# Patient Record
Sex: FEMALE | Race: WHITE | Employment: PART TIME | URBAN - METROPOLITAN AREA
[De-identification: names, ages, dates, MRNs, and addresses within clinical notes are randomized per-mention and may not be internally consistent; named-entity substitution may affect disease eponyms.]

---

## 2019-03-13 DIAGNOSIS — N39.41 URGE INCONTINENCE: ICD-10-CM

## 2019-03-13 DIAGNOSIS — N81.4 UTERINE PROLAPSE: ICD-10-CM

## 2019-03-13 DIAGNOSIS — N81.6 RECTOCELE: ICD-10-CM

## 2019-03-13 DIAGNOSIS — N81.11 MIDLINE CYSTOCELE: ICD-10-CM

## 2019-03-13 PROBLEM — I10 HYPERTENSION: Status: ACTIVE | Noted: 2019-03-13

## 2019-03-13 RX ORDER — CARVEDILOL 6.25 MG/1
TABLET ORAL
COMMUNITY
Start: 2019-03-10

## 2019-03-13 SDOH — HEALTH STABILITY: MENTAL HEALTH: HOW OFTEN DO YOU HAVE A DRINK CONTAINING ALCOHOL?: NEVER

## 2019-03-18 ENCOUNTER — OFFICE VISIT (OUTPATIENT)
Dept: UROLOGY | Age: 79
End: 2019-03-18
Payer: MEDICARE

## 2019-03-18 ENCOUNTER — HOSPITAL ENCOUNTER (OUTPATIENT)
Age: 79
Setting detail: SPECIMEN
Discharge: HOME | End: 2019-03-18
Payer: MEDICARE

## 2019-03-18 VITALS
BODY MASS INDEX: 43.05 KG/M2 | TEMPERATURE: 97.9 F | DIASTOLIC BLOOD PRESSURE: 78 MMHG | SYSTOLIC BLOOD PRESSURE: 140 MMHG | WEIGHT: 228 LBS | HEIGHT: 61 IN

## 2019-03-18 DIAGNOSIS — N39.46 MIXED INCONTINENCE URGE AND STRESS: ICD-10-CM

## 2019-03-18 DIAGNOSIS — R35.0 FREQUENCY OF URINATION: ICD-10-CM

## 2019-03-18 DIAGNOSIS — R35.0 FREQUENCY OF URINATION: Primary | ICD-10-CM

## 2019-03-18 DIAGNOSIS — R35.1 NOCTURIA: ICD-10-CM

## 2019-03-18 LAB
-: ABNORMAL
AMORPHOUS: ABNORMAL
BACTERIA: ABNORMAL
BILIRUBIN URINE: NEGATIVE
CASTS UA: ABNORMAL /LPF
COLOR: YELLOW
COMMENT UA: ABNORMAL
CRYSTALS, UA: ABNORMAL /HPF
EPITHELIAL CELLS UA: ABNORMAL /HPF (ref 0–25)
GLUCOSE URINE: NEGATIVE
KETONES, URINE: NEGATIVE
LEUKOCYTE ESTERASE, URINE: ABNORMAL
MUCUS: ABNORMAL
NITRITE, URINE: POSITIVE
OTHER OBSERVATIONS UA: ABNORMAL
PH UA: 5.5 (ref 5–9)
PROTEIN UA: NEGATIVE
RBC UA: ABNORMAL /HPF (ref 0–2)
RENAL EPITHELIAL, UA: ABNORMAL /HPF
SPECIFIC GRAVITY UA: 1.02 (ref 1.01–1.02)
TRICHOMONAS: ABNORMAL
TURBIDITY: CLEAR
URINE HGB: ABNORMAL
UROBILINOGEN, URINE: NORMAL
WBC UA: ABNORMAL /HPF (ref 0–5)
YEAST: ABNORMAL

## 2019-03-18 PROCEDURE — G8427 DOCREV CUR MEDS BY ELIG CLIN: HCPCS | Performed by: NURSE PRACTITIONER

## 2019-03-18 PROCEDURE — 87086 URINE CULTURE/COLONY COUNT: CPT

## 2019-03-18 PROCEDURE — G8417 CALC BMI ABV UP PARAM F/U: HCPCS | Performed by: NURSE PRACTITIONER

## 2019-03-18 PROCEDURE — 4040F PNEUMOC VAC/ADMIN/RCVD: CPT | Performed by: NURSE PRACTITIONER

## 2019-03-18 PROCEDURE — 1036F TOBACCO NON-USER: CPT | Performed by: NURSE PRACTITIONER

## 2019-03-18 PROCEDURE — 81001 URINALYSIS AUTO W/SCOPE: CPT

## 2019-03-18 PROCEDURE — 87077 CULTURE AEROBIC IDENTIFY: CPT

## 2019-03-18 PROCEDURE — 1090F PRES/ABSN URINE INCON ASSESS: CPT | Performed by: NURSE PRACTITIONER

## 2019-03-18 PROCEDURE — 99204 OFFICE O/P NEW MOD 45 MIN: CPT | Performed by: NURSE PRACTITIONER

## 2019-03-18 PROCEDURE — 1101F PT FALLS ASSESS-DOCD LE1/YR: CPT | Performed by: NURSE PRACTITIONER

## 2019-03-18 PROCEDURE — G8484 FLU IMMUNIZE NO ADMIN: HCPCS | Performed by: NURSE PRACTITIONER

## 2019-03-18 PROCEDURE — 51798 US URINE CAPACITY MEASURE: CPT | Performed by: NURSE PRACTITIONER

## 2019-03-18 PROCEDURE — 0509F URINE INCON PLAN DOCD: CPT | Performed by: NURSE PRACTITIONER

## 2019-03-18 PROCEDURE — 87186 SC STD MICRODIL/AGAR DIL: CPT

## 2019-03-18 PROCEDURE — G8400 PT W/DXA NO RESULTS DOC: HCPCS | Performed by: NURSE PRACTITIONER

## 2019-03-18 PROCEDURE — 1123F ACP DISCUSS/DSCN MKR DOCD: CPT | Performed by: NURSE PRACTITIONER

## 2019-03-18 RX ORDER — ERGOCALCIFEROL (VITAMIN D2) 1250 MCG
50000 CAPSULE ORAL
COMMUNITY
Start: 2012-05-22

## 2019-03-18 RX ORDER — TROSPIUM CHLORIDE 20 MG/1
20 TABLET, FILM COATED ORAL 2 TIMES DAILY
Qty: 60 TABLET | Refills: 3 | Status: SHIPPED | OUTPATIENT
Start: 2019-03-18 | End: 2019-04-30 | Stop reason: SDUPTHER

## 2019-03-18 RX ORDER — NAPROXEN SODIUM 220 MG
220 TABLET ORAL DAILY
COMMUNITY

## 2019-03-18 ASSESSMENT — ENCOUNTER SYMPTOMS
EYE PAIN: 0
SHORTNESS OF BREATH: 0
COLOR CHANGE: 0
CONSTIPATION: 0
COUGH: 0
EYE REDNESS: 0
WHEEZING: 0
VOMITING: 0
BACK PAIN: 0
ABDOMINAL PAIN: 0
NAUSEA: 0

## 2019-03-20 ENCOUNTER — TELEPHONE (OUTPATIENT)
Dept: UROLOGY | Age: 79
End: 2019-03-20

## 2019-03-20 LAB
CULTURE: ABNORMAL
Lab: ABNORMAL
SPECIMEN DESCRIPTION: ABNORMAL

## 2019-03-20 RX ORDER — CIPROFLOXACIN 500 MG/1
500 TABLET, FILM COATED ORAL 2 TIMES DAILY
Qty: 14 TABLET | Refills: 0 | Status: SHIPPED | OUTPATIENT
Start: 2019-03-20 | End: 2019-03-27

## 2019-04-30 ENCOUNTER — OFFICE VISIT (OUTPATIENT)
Dept: UROLOGY | Age: 79
End: 2019-04-30
Payer: MEDICARE

## 2019-04-30 ENCOUNTER — HOSPITAL ENCOUNTER (OUTPATIENT)
Age: 79
Setting detail: SPECIMEN
Discharge: HOME | End: 2019-04-30
Payer: MEDICARE

## 2019-04-30 VITALS — BODY MASS INDEX: 42.51 KG/M2 | WEIGHT: 225 LBS | SYSTOLIC BLOOD PRESSURE: 146 MMHG | DIASTOLIC BLOOD PRESSURE: 94 MMHG

## 2019-04-30 DIAGNOSIS — R35.0 FREQUENCY OF URINATION: ICD-10-CM

## 2019-04-30 DIAGNOSIS — N39.46 MIXED INCONTINENCE URGE AND STRESS: ICD-10-CM

## 2019-04-30 DIAGNOSIS — R35.1 NOCTURIA: ICD-10-CM

## 2019-04-30 DIAGNOSIS — R35.0 FREQUENCY OF URINATION: Primary | ICD-10-CM

## 2019-04-30 PROCEDURE — 1090F PRES/ABSN URINE INCON ASSESS: CPT | Performed by: NURSE PRACTITIONER

## 2019-04-30 PROCEDURE — 4040F PNEUMOC VAC/ADMIN/RCVD: CPT | Performed by: NURSE PRACTITIONER

## 2019-04-30 PROCEDURE — G8427 DOCREV CUR MEDS BY ELIG CLIN: HCPCS | Performed by: NURSE PRACTITIONER

## 2019-04-30 PROCEDURE — G8400 PT W/DXA NO RESULTS DOC: HCPCS | Performed by: NURSE PRACTITIONER

## 2019-04-30 PROCEDURE — 51798 US URINE CAPACITY MEASURE: CPT | Performed by: NURSE PRACTITIONER

## 2019-04-30 PROCEDURE — 87086 URINE CULTURE/COLONY COUNT: CPT

## 2019-04-30 PROCEDURE — 87186 SC STD MICRODIL/AGAR DIL: CPT

## 2019-04-30 PROCEDURE — 0509F URINE INCON PLAN DOCD: CPT | Performed by: NURSE PRACTITIONER

## 2019-04-30 PROCEDURE — 87077 CULTURE AEROBIC IDENTIFY: CPT

## 2019-04-30 PROCEDURE — 99214 OFFICE O/P EST MOD 30 MIN: CPT | Performed by: NURSE PRACTITIONER

## 2019-04-30 PROCEDURE — G8417 CALC BMI ABV UP PARAM F/U: HCPCS | Performed by: NURSE PRACTITIONER

## 2019-04-30 PROCEDURE — 1123F ACP DISCUSS/DSCN MKR DOCD: CPT | Performed by: NURSE PRACTITIONER

## 2019-04-30 PROCEDURE — 81001 URINALYSIS AUTO W/SCOPE: CPT

## 2019-04-30 PROCEDURE — 1036F TOBACCO NON-USER: CPT | Performed by: NURSE PRACTITIONER

## 2019-04-30 RX ORDER — TROSPIUM CHLORIDE 20 MG/1
20 TABLET, FILM COATED ORAL 2 TIMES DAILY
Qty: 180 TABLET | Refills: 3 | Status: SHIPPED | OUTPATIENT
Start: 2019-04-30 | End: 2019-09-03 | Stop reason: SINTOL

## 2019-04-30 ASSESSMENT — ENCOUNTER SYMPTOMS
VOMITING: 0
SHORTNESS OF BREATH: 0
NAUSEA: 0
COUGH: 0
COLOR CHANGE: 0
BACK PAIN: 0
EYE PAIN: 0
CONSTIPATION: 1
EYE REDNESS: 0
ABDOMINAL PAIN: 0
WHEEZING: 0

## 2019-04-30 NOTE — PATIENT INSTRUCTIONS
It is very important to have regular, daily, bowel movements because this will improve urinary symptoms. Take Miralax (or generic equivalent) 17 g once daily (one capful). Do not skip days. If 1 dose daily causes loose stools/diarrhea, decrease to 1/2 dose or 1/4 dose but be sure to continue taking it daily (it is not the type of medication that you can just use \"as needed,\" must be taken daily to be effective).        Be sure to increase fluids, aim for 80 ounces per day

## 2019-04-30 NOTE — PROGRESS NOTES
HPI:    Patient is a 70 y.o. female in no acute distress. She is alert and oriented to person, place, and time. History  3/2019 Referral from Dr. Peña White for incontinence. She has been experiencing LUTS for the last 15 years. She complains of:    Daytime frequency: Every 1-2 hours. Urgency: yes  Nocturia: Gets up every 2 hours at night. WILFREDO: yes, with position changes, coughing, sneezing, etc... UUI: yes  Some unaware incontinence. Pads per day: 3-4 heavy, large pads. Pads are saturated when changed. She does have 7 children, 6 of which were vaginal births. She is status post lap Velez, A&P repair on 2019. She has tried oxybutynin past, but developed intolerable dry mouth. She does have daily bowel movements. She denies any episodes of hematuria. She denies history of stones. She denies history of frequent urinary tract infections. She does consume 16 ounces of coffee or tea daily. She did see urology in [facility] in the past.     Today  Here today for a 6 week follow-up for frequency, urgency, and mixed incontinence. At her last visit we did start her on trospium twice per day. She is now urinating every 2-3 hours versus every 1-2. She is only getting up 1-2 times per night versus every 2 hours. He is now only using 2 small pads per day versus 3-4 heavy, large pads. She does continue to have stress incontinence with position changes, lifting, coughing, and sneezing. She does have dry mouth with the trospium, but this is tolerable. She has had some trouble with her bowels, they are not always moving daily since starting the trospium. PVR 86ml.       Past Medical History:   Diagnosis Date    Anemia     High blood pressure     High cholesterol     UTI (urinary tract infection)     Wears glasses      Past Surgical History:   Procedure Laterality Date    BACK SURGERY      lower     SECTION      CHOLECYSTECTOMY      COLPOPEXY  2019    DENTAL SURGERY      KNEE SURGERY      (including worsening symptoms, infection, pain) and benefits of pelvic floor rehab to help with WILFREDO. We typically schedule PFR once per week for 6 weeks initially, then re-evaluate at that time. Sessions typically last one hour each week. We will check an explanation of benefits with patient's insurance company prior to starting therapy. Patient is agreeable with this plan. We will check a UA C&S prior to starting pelvic floor rehab.

## 2019-05-01 LAB
CULTURE: ABNORMAL
Lab: ABNORMAL
SPECIMEN DESCRIPTION: ABNORMAL

## 2019-05-02 ENCOUNTER — TELEPHONE (OUTPATIENT)
Dept: UROLOGY | Age: 79
End: 2019-05-02

## 2019-05-02 DIAGNOSIS — N39.0 RECURRENT UTI: Primary | ICD-10-CM

## 2019-05-02 RX ORDER — GRANULES FOR ORAL 3 G/1
3 POWDER ORAL
Qty: 3 EACH | Refills: 0 | Status: SHIPPED | OUTPATIENT
Start: 2019-05-02 | End: 2019-05-22 | Stop reason: ALTCHOICE

## 2019-05-02 NOTE — TELEPHONE ENCOUNTER
[facility] called and left a message for patient to return a call to the office.

## 2019-05-02 NOTE — TELEPHONE ENCOUNTER
UACS is positive for infection. I sent in fosfomycin to treat infection. This is a drink you take once and repeat the dose every 3 days. This is the same type of bacteria you had on your last UACS. I would like to complete a CT scan to check for stones due to the type of bacteria.

## 2019-05-10 ENCOUNTER — HOSPITAL ENCOUNTER (OUTPATIENT)
Dept: CT IMAGING | Age: 79
Discharge: HOME | End: 2019-05-12
Payer: MEDICARE

## 2019-05-10 DIAGNOSIS — N39.0 RECURRENT UTI: ICD-10-CM

## 2019-05-10 PROCEDURE — 74176 CT ABD & PELVIS W/O CONTRAST: CPT

## 2019-05-13 ENCOUNTER — TELEPHONE (OUTPATIENT)
Dept: UROLOGY | Age: 79
End: 2019-05-13

## 2019-05-13 DIAGNOSIS — N39.0 RECURRENT UTI: Primary | ICD-10-CM

## 2019-05-13 NOTE — TELEPHONE ENCOUNTER
According to last telephone encounter she needed a UACS after finishing her antibiotic. Have her do her urine in [place]. We will move out her PFR until next week. We will go over CT results at her PFR apt.  [she lives in [address] [place] [address]

## 2019-05-13 NOTE — TELEPHONE ENCOUNTER
Called patient and informed her that repeat UACS needed prior to starting PFR.  Orders faxed to [facility] per patient's request.

## 2019-05-13 NOTE — TELEPHONE ENCOUNTER
Called patient and left message reminding her of her appointment for PFR #1 tomorrow. Is she to have repeat UACS prior to PFR? Or should she come in to review CT results.

## 2019-05-16 ENCOUNTER — TELEPHONE (OUTPATIENT)
Dept: UROLOGY | Age: 79
End: 2019-05-16

## 2019-05-16 DIAGNOSIS — N39.0 RECURRENT UTI: ICD-10-CM

## 2019-05-17 NOTE — TELEPHONE ENCOUNTER
She is good to start PFR. urine cx reviewed and shows contamination, but the UA is negative for signs of a UTI & for significant microhematuria. CT results we will go over at her visit.

## 2019-05-22 ENCOUNTER — PROCEDURE VISIT (OUTPATIENT)
Dept: UROLOGY | Age: 79
End: 2019-05-22
Payer: MEDICARE

## 2019-05-22 VITALS — SYSTOLIC BLOOD PRESSURE: 136 MMHG | DIASTOLIC BLOOD PRESSURE: 88 MMHG | WEIGHT: 223 LBS | BODY MASS INDEX: 42.14 KG/M2

## 2019-05-22 DIAGNOSIS — M62.81 MUSCLE WEAKNESS: ICD-10-CM

## 2019-05-22 DIAGNOSIS — N39.46 MIXED INCONTINENCE URGE AND STRESS: Primary | ICD-10-CM

## 2019-05-22 DIAGNOSIS — N39.0 RECURRENT UTI: ICD-10-CM

## 2019-05-22 PROCEDURE — G8427 DOCREV CUR MEDS BY ELIG CLIN: HCPCS | Performed by: NURSE PRACTITIONER

## 2019-05-22 PROCEDURE — G8417 CALC BMI ABV UP PARAM F/U: HCPCS | Performed by: NURSE PRACTITIONER

## 2019-05-22 PROCEDURE — 0509F URINE INCON PLAN DOCD: CPT | Performed by: NURSE PRACTITIONER

## 2019-05-22 PROCEDURE — 1036F TOBACCO NON-USER: CPT | Performed by: NURSE PRACTITIONER

## 2019-05-22 PROCEDURE — 51784 ANAL/URINARY MUSCLE STUDY: CPT | Performed by: NURSE PRACTITIONER

## 2019-05-22 PROCEDURE — G8400 PT W/DXA NO RESULTS DOC: HCPCS | Performed by: NURSE PRACTITIONER

## 2019-05-22 PROCEDURE — 91122 PR ANAL PRESSURE RECORD: CPT | Performed by: NURSE PRACTITIONER

## 2019-05-22 PROCEDURE — 97750 PHYSICAL PERFORMANCE TEST: CPT | Performed by: NURSE PRACTITIONER

## 2019-05-22 PROCEDURE — 1090F PRES/ABSN URINE INCON ASSESS: CPT | Performed by: NURSE PRACTITIONER

## 2019-05-22 PROCEDURE — 99213 OFFICE O/P EST LOW 20 MIN: CPT | Performed by: NURSE PRACTITIONER

## 2019-05-22 PROCEDURE — 97032 APPL MODALITY 1+ESTIM EA 15: CPT | Performed by: NURSE PRACTITIONER

## 2019-05-22 PROCEDURE — 4040F PNEUMOC VAC/ADMIN/RCVD: CPT | Performed by: NURSE PRACTITIONER

## 2019-05-22 PROCEDURE — 1123F ACP DISCUSS/DSCN MKR DOCD: CPT | Performed by: NURSE PRACTITIONER

## 2019-05-28 ENCOUNTER — PROCEDURE VISIT (OUTPATIENT)
Dept: UROLOGY | Age: 79
End: 2019-05-28
Payer: MEDICARE

## 2019-05-28 VITALS
SYSTOLIC BLOOD PRESSURE: 148 MMHG | DIASTOLIC BLOOD PRESSURE: 88 MMHG | WEIGHT: 224 LBS | HEIGHT: 65 IN | BODY MASS INDEX: 37.32 KG/M2

## 2019-05-28 DIAGNOSIS — M62.81 MUSCLE WEAKNESS: ICD-10-CM

## 2019-05-28 DIAGNOSIS — N39.46 MIXED INCONTINENCE URGE AND STRESS: Primary | ICD-10-CM

## 2019-05-28 PROCEDURE — 51784 ANAL/URINARY MUSCLE STUDY: CPT | Performed by: NURSE PRACTITIONER

## 2019-05-28 PROCEDURE — 91122 PR ANAL PRESSURE RECORD: CPT | Performed by: NURSE PRACTITIONER

## 2019-05-28 PROCEDURE — 97750 PHYSICAL PERFORMANCE TEST: CPT | Performed by: NURSE PRACTITIONER

## 2019-05-28 NOTE — PATIENT INSTRUCTIONS
SURVEY:    You may be receiving a survey from [employer] regarding your visit today. Please complete the survey to enable us to provide the highest quality of care to you and your family. If you cannot score us a very good on any question, please call the office to discuss how we could have made your experience a very good one. Thank you.

## 2019-06-04 ENCOUNTER — PROCEDURE VISIT (OUTPATIENT)
Dept: UROLOGY | Age: 79
End: 2019-06-04
Payer: MEDICARE

## 2019-06-04 VITALS — DIASTOLIC BLOOD PRESSURE: 88 MMHG | BODY MASS INDEX: 37.28 KG/M2 | SYSTOLIC BLOOD PRESSURE: 124 MMHG | WEIGHT: 224 LBS

## 2019-06-04 DIAGNOSIS — N39.46 MIXED INCONTINENCE URGE AND STRESS: Primary | ICD-10-CM

## 2019-06-04 DIAGNOSIS — M62.81 MUSCLE WEAKNESS: ICD-10-CM

## 2019-06-04 PROCEDURE — 91122 PR ANAL PRESSURE RECORD: CPT | Performed by: NURSE PRACTITIONER

## 2019-06-04 PROCEDURE — 51784 ANAL/URINARY MUSCLE STUDY: CPT | Performed by: NURSE PRACTITIONER

## 2019-06-04 PROCEDURE — 97032 APPL MODALITY 1+ESTIM EA 15: CPT | Performed by: NURSE PRACTITIONER

## 2019-06-04 PROCEDURE — 97750 PHYSICAL PERFORMANCE TEST: CPT | Performed by: NURSE PRACTITIONER

## 2019-06-04 NOTE — PROGRESS NOTES
PELVIC FLOOR REHAB FOLLOW UP    At last visit (PFR # 3, 1 weeks ago): Was able to hold for 6 seconds at moderate amplitude. Fatigue after 4 repetitions.      Home exercise regimen prescribed:   Repetitions - 6   Contract - 6 sec   Relax - 10 sec   + 10 Quick Flicks  Frequency- 4 times daily     Unable to perform stim due to machine malfunction         Symptoms  Daytime voiding frequency: q1-2  hrs, unchanged  Nighttime voiding frequency: 2-3 times per night, decreased  Urgency: moderate, unchanged    Incontinence episodes per day: >10, says she is \"trickling vs gushing like before\" Causes: standing, walking, any activity, unchanged  Number of pads used per day: 2-3, unchanged, less saturated    Pelvic pain: none    Bowel habits: unchanged  Fecal incontinence: none    OVERALL IMPROVEMENT SINCE INITIAL SESSION:   mild in degree    Compliance:  Has pt completed exercises as instructed: Yes    Changes Since Initial Visit  Intake of spicy/citrus/tomato based foods: unchanged  Caffeine intake per day: unchanged  Fluid intake per day: intake  Alcohol intake: unchanged  Smoking: unchanged    Was able to hold for 6-8 seconds at moderate-weak amplitude amplitude. Fatigue after 6 repetitions. Home exercise regimen prescribed:   Repetitions - 8   Contract - 8 sec   Relax - 10 sec   + 10 Quick Flicks  Frequency- 4 times daily    Stimulated with 47 average mA for 15 minutes.

## 2019-06-18 ENCOUNTER — PROCEDURE VISIT (OUTPATIENT)
Dept: UROLOGY | Age: 79
End: 2019-06-18
Payer: MEDICARE

## 2019-06-18 VITALS — DIASTOLIC BLOOD PRESSURE: 78 MMHG | SYSTOLIC BLOOD PRESSURE: 114 MMHG | WEIGHT: 220 LBS | BODY MASS INDEX: 36.61 KG/M2

## 2019-06-18 DIAGNOSIS — N39.46 MIXED INCONTINENCE URGE AND STRESS: Primary | ICD-10-CM

## 2019-06-18 DIAGNOSIS — M62.81 MUSCLE WEAKNESS: ICD-10-CM

## 2019-06-18 PROCEDURE — 91122 PR ANAL PRESSURE RECORD: CPT | Performed by: NURSE PRACTITIONER

## 2019-06-18 PROCEDURE — 51784 ANAL/URINARY MUSCLE STUDY: CPT | Performed by: NURSE PRACTITIONER

## 2019-06-18 PROCEDURE — 97032 APPL MODALITY 1+ESTIM EA 15: CPT | Performed by: NURSE PRACTITIONER

## 2019-06-18 PROCEDURE — 97750 PHYSICAL PERFORMANCE TEST: CPT | Performed by: NURSE PRACTITIONER

## 2019-06-25 ENCOUNTER — PROCEDURE VISIT (OUTPATIENT)
Dept: UROLOGY | Age: 79
End: 2019-06-25
Payer: MEDICARE

## 2019-06-25 VITALS — SYSTOLIC BLOOD PRESSURE: 120 MMHG | WEIGHT: 220 LBS | BODY MASS INDEX: 36.61 KG/M2 | DIASTOLIC BLOOD PRESSURE: 78 MMHG

## 2019-06-25 DIAGNOSIS — M62.81 MUSCLE WEAKNESS: ICD-10-CM

## 2019-06-25 DIAGNOSIS — N39.46 MIXED INCONTINENCE URGE AND STRESS: Primary | ICD-10-CM

## 2019-06-25 PROCEDURE — 91122 PR ANAL PRESSURE RECORD: CPT | Performed by: NURSE PRACTITIONER

## 2019-06-25 PROCEDURE — 51784 ANAL/URINARY MUSCLE STUDY: CPT | Performed by: NURSE PRACTITIONER

## 2019-06-25 PROCEDURE — 97750 PHYSICAL PERFORMANCE TEST: CPT | Performed by: NURSE PRACTITIONER

## 2019-06-25 PROCEDURE — 97032 APPL MODALITY 1+ESTIM EA 15: CPT | Performed by: NURSE PRACTITIONER

## 2019-06-25 NOTE — PROGRESS NOTES
PELVIC FLOOR REHAB FOLLOW UP    At last visit (PFR # 5, 1 weeks ago): Was able to hold for 8 seconds at moderate-weak amplitude. Fatigue after 7 repetitions.      Home exercise regimen prescribed:   Repetitions - 9   Contract - 10 sec   Relax - 10 sec   + 10 Quick Flicks  Frequency- 4 times daily     Stimulated with 66 average mA for 15 minutes. Symptoms  Daytime voiding frequency: q 2-2.5 hrs, decreased  Nighttime voiding frequency: 1-2 times per night, unchanged  Urgency: mild- moderate, decreased    Incontinence episodes per day: <5, Causes: activity, unchanged  Number of pads used per day: 1-2, using a thinner pad. Moisture amount varies, decreased    Pelvic pain: unchanged    Bowel habits: unchanged  Fecal incontinence: unchanged    OVERALL IMPROVEMENT SINCE INITIAL SESSION:   mild in degree    Compliance:  Has pt completed exercises as instructed: Yes    Changes Since Initial Visit  Intake of spicy/citrus/tomato based foods: unchanged  Caffeine intake per day: unchanged  Fluid intake per day: unchanged  Alcohol intake: unchanged  Smoking: unchanged    Was able to hold for 4 seconds at strong amplitude and 4 additional seconds at weak amplitude. Fatigue after 6 repetitions. Home exercise regimen prescribed:   Repetitions - 8   Contract - 10 sec   Relax - 10 sec   + 10 Quick Flicks  Frequency- 4 times daily    Stimulated with 35 average mA for 15 minutes.

## 2019-07-16 ENCOUNTER — PROCEDURE VISIT (OUTPATIENT)
Dept: UROLOGY | Age: 79
End: 2019-07-16
Payer: MEDICARE

## 2019-07-16 VITALS — DIASTOLIC BLOOD PRESSURE: 70 MMHG | WEIGHT: 220 LBS | SYSTOLIC BLOOD PRESSURE: 110 MMHG | BODY MASS INDEX: 36.61 KG/M2

## 2019-07-16 DIAGNOSIS — N39.46 MIXED INCONTINENCE URGE AND STRESS: Primary | ICD-10-CM

## 2019-07-16 DIAGNOSIS — M62.81 MUSCLE WEAKNESS: ICD-10-CM

## 2019-07-16 PROCEDURE — 51784 ANAL/URINARY MUSCLE STUDY: CPT | Performed by: NURSE PRACTITIONER

## 2019-07-16 PROCEDURE — 97032 APPL MODALITY 1+ESTIM EA 15: CPT | Performed by: NURSE PRACTITIONER

## 2019-07-16 PROCEDURE — 97750 PHYSICAL PERFORMANCE TEST: CPT | Performed by: NURSE PRACTITIONER

## 2019-07-16 PROCEDURE — 91122 PR ANAL PRESSURE RECORD: CPT | Performed by: NURSE PRACTITIONER

## 2019-07-24 ENCOUNTER — PROCEDURE VISIT (OUTPATIENT)
Dept: UROLOGY | Age: 79
End: 2019-07-24
Payer: MEDICARE

## 2019-07-24 VITALS — DIASTOLIC BLOOD PRESSURE: 84 MMHG | WEIGHT: 220 LBS | SYSTOLIC BLOOD PRESSURE: 128 MMHG | BODY MASS INDEX: 36.61 KG/M2

## 2019-07-24 DIAGNOSIS — M62.81 MUSCLE WEAKNESS: ICD-10-CM

## 2019-07-24 DIAGNOSIS — N39.46 MIXED INCONTINENCE URGE AND STRESS: Primary | ICD-10-CM

## 2019-07-24 PROCEDURE — 97032 APPL MODALITY 1+ESTIM EA 15: CPT | Performed by: NURSE PRACTITIONER

## 2019-08-06 ENCOUNTER — PROCEDURE VISIT (OUTPATIENT)
Dept: UROLOGY | Age: 79
End: 2019-08-06
Payer: MEDICARE

## 2019-08-06 VITALS — SYSTOLIC BLOOD PRESSURE: 120 MMHG | BODY MASS INDEX: 36.11 KG/M2 | DIASTOLIC BLOOD PRESSURE: 86 MMHG | WEIGHT: 217 LBS

## 2019-08-06 DIAGNOSIS — N39.46 MIXED INCONTINENCE URGE AND STRESS: Primary | ICD-10-CM

## 2019-08-06 DIAGNOSIS — M62.81 MUSCLE WEAKNESS: ICD-10-CM

## 2019-08-06 PROCEDURE — 97032 APPL MODALITY 1+ESTIM EA 15: CPT | Performed by: NURSE PRACTITIONER

## 2019-08-06 PROCEDURE — 97750 PHYSICAL PERFORMANCE TEST: CPT | Performed by: NURSE PRACTITIONER

## 2019-08-06 PROCEDURE — 91122 PR ANAL PRESSURE RECORD: CPT | Performed by: NURSE PRACTITIONER

## 2019-08-06 PROCEDURE — 51784 ANAL/URINARY MUSCLE STUDY: CPT | Performed by: NURSE PRACTITIONER

## 2019-08-20 ENCOUNTER — PROCEDURE VISIT (OUTPATIENT)
Dept: UROLOGY | Age: 79
End: 2019-08-20
Payer: MEDICARE

## 2019-08-20 VITALS
HEIGHT: 61 IN | WEIGHT: 216 LBS | SYSTOLIC BLOOD PRESSURE: 159 MMHG | DIASTOLIC BLOOD PRESSURE: 81 MMHG | BODY MASS INDEX: 40.78 KG/M2

## 2019-08-20 DIAGNOSIS — M62.81 MUSCLE WEAKNESS: ICD-10-CM

## 2019-08-20 DIAGNOSIS — N39.46 MIXED INCONTINENCE URGE AND STRESS: Primary | ICD-10-CM

## 2019-08-20 PROCEDURE — 97032 APPL MODALITY 1+ESTIM EA 15: CPT | Performed by: NURSE PRACTITIONER

## 2019-08-20 PROCEDURE — 97750 PHYSICAL PERFORMANCE TEST: CPT | Performed by: NURSE PRACTITIONER

## 2019-08-20 PROCEDURE — 91122 PR ANAL PRESSURE RECORD: CPT | Performed by: NURSE PRACTITIONER

## 2019-08-20 PROCEDURE — 51784 ANAL/URINARY MUSCLE STUDY: CPT | Performed by: NURSE PRACTITIONER

## 2019-08-20 NOTE — PROGRESS NOTES
PELVIC FLOOR REHAB FOLLOW UP    At last visit (PFR # 8, 2 weeks ago): Was able to hold for 8 seconds at weak amplitude. Fatigue after 8 repetitions.      Home exercise regimen prescribed:   Repetitions - 10   Contract - 10 sec   Relax - 10 sec   + 10 Quick Flicks  Frequency- 4 times daily     Stimulated with 18 average mA for 15 minutes.        Symptoms  Daytime voiding frequency: q 2-3 hrs, unchanged  Nighttime voiding frequency: 1 times per night, unchanged  Urgency: mild, unchanged, maybe decreased slightly    Incontinence episodes per day: 3-4, Causes: lifting, standing, walking, decreased  Number of pads used per day: 2-3 pads, but smaller pads than when first started, decreased    Pelvic pain: unchanged, none    Bowel habits: unchanged, unchanged  Fecal incontinence: unchanged, none    OVERALL IMPROVEMENT SINCE INITIAL SESSION:   moderate in degree    Compliance:  Has pt completed exercises as instructed: Yes    Changes Since Initial Visit  Intake of spicy/citrus/tomato based foods: unchanged  Caffeine intake per day: unchanged  Fluid intake per day: unchanged  Alcohol intake: unchanged, none  Smoking: unchanged, none    Was able to hold for 10 seconds at weak-moderate amplitude. Fatigue after 10 repetitions. Home exercise regimen prescribed:   Repetitions - 10   Contract - 10 sec   Relax - 10 sec   + 10 Quick Flicks  Frequency- 4 times daily    Stimulated with 30 average mA for 15 minutes.

## 2019-09-03 ENCOUNTER — PROCEDURE VISIT (OUTPATIENT)
Dept: UROLOGY | Age: 79
End: 2019-09-03
Payer: MEDICARE

## 2019-09-03 VITALS — DIASTOLIC BLOOD PRESSURE: 84 MMHG | WEIGHT: 220 LBS | BODY MASS INDEX: 41.57 KG/M2 | SYSTOLIC BLOOD PRESSURE: 138 MMHG

## 2019-09-03 DIAGNOSIS — N39.46 MIXED INCONTINENCE URGE AND STRESS: Primary | ICD-10-CM

## 2019-09-03 DIAGNOSIS — M62.81 MUSCLE WEAKNESS: ICD-10-CM

## 2019-09-03 PROCEDURE — 97032 APPL MODALITY 1+ESTIM EA 15: CPT | Performed by: NURSE PRACTITIONER

## 2019-09-03 PROCEDURE — 1123F ACP DISCUSS/DSCN MKR DOCD: CPT | Performed by: NURSE PRACTITIONER

## 2019-09-03 PROCEDURE — 1090F PRES/ABSN URINE INCON ASSESS: CPT | Performed by: NURSE PRACTITIONER

## 2019-09-03 PROCEDURE — 97750 PHYSICAL PERFORMANCE TEST: CPT | Performed by: NURSE PRACTITIONER

## 2019-09-03 PROCEDURE — 99214 OFFICE O/P EST MOD 30 MIN: CPT | Performed by: NURSE PRACTITIONER

## 2019-09-03 PROCEDURE — 91122 PR ANAL PRESSURE RECORD: CPT | Performed by: NURSE PRACTITIONER

## 2019-09-03 PROCEDURE — 4040F PNEUMOC VAC/ADMIN/RCVD: CPT | Performed by: NURSE PRACTITIONER

## 2019-09-03 PROCEDURE — 1036F TOBACCO NON-USER: CPT | Performed by: NURSE PRACTITIONER

## 2019-09-03 PROCEDURE — G8417 CALC BMI ABV UP PARAM F/U: HCPCS | Performed by: NURSE PRACTITIONER

## 2019-09-03 PROCEDURE — 0509F URINE INCON PLAN DOCD: CPT | Performed by: NURSE PRACTITIONER

## 2019-09-03 PROCEDURE — 51784 ANAL/URINARY MUSCLE STUDY: CPT | Performed by: NURSE PRACTITIONER

## 2019-09-03 PROCEDURE — G8427 DOCREV CUR MEDS BY ELIG CLIN: HCPCS | Performed by: NURSE PRACTITIONER

## 2019-09-03 PROCEDURE — G8400 PT W/DXA NO RESULTS DOC: HCPCS | Performed by: NURSE PRACTITIONER

## 2019-09-04 ENCOUNTER — TELEPHONE (OUTPATIENT)
Dept: UROLOGY | Age: 79
End: 2019-09-04

## 2019-12-03 ENCOUNTER — OFFICE VISIT (OUTPATIENT)
Dept: UROLOGY | Age: 79
End: 2019-12-03
Payer: MEDICARE

## 2019-12-03 VITALS — BODY MASS INDEX: 41.19 KG/M2 | SYSTOLIC BLOOD PRESSURE: 144 MMHG | DIASTOLIC BLOOD PRESSURE: 98 MMHG | WEIGHT: 218 LBS

## 2019-12-03 DIAGNOSIS — R35.0 FREQUENCY OF URINATION: ICD-10-CM

## 2019-12-03 DIAGNOSIS — R35.1 NOCTURIA: ICD-10-CM

## 2019-12-03 DIAGNOSIS — N39.46 MIXED INCONTINENCE URGE AND STRESS: Primary | ICD-10-CM

## 2019-12-03 PROCEDURE — 4040F PNEUMOC VAC/ADMIN/RCVD: CPT | Performed by: NURSE PRACTITIONER

## 2019-12-03 PROCEDURE — 0509F URINE INCON PLAN DOCD: CPT | Performed by: NURSE PRACTITIONER

## 2019-12-03 PROCEDURE — G8400 PT W/DXA NO RESULTS DOC: HCPCS | Performed by: NURSE PRACTITIONER

## 2019-12-03 PROCEDURE — 1036F TOBACCO NON-USER: CPT | Performed by: NURSE PRACTITIONER

## 2019-12-03 PROCEDURE — 51798 US URINE CAPACITY MEASURE: CPT | Performed by: NURSE PRACTITIONER

## 2019-12-03 PROCEDURE — G8417 CALC BMI ABV UP PARAM F/U: HCPCS | Performed by: NURSE PRACTITIONER

## 2019-12-03 PROCEDURE — G8484 FLU IMMUNIZE NO ADMIN: HCPCS | Performed by: NURSE PRACTITIONER

## 2019-12-03 PROCEDURE — 1090F PRES/ABSN URINE INCON ASSESS: CPT | Performed by: NURSE PRACTITIONER

## 2019-12-03 PROCEDURE — 1123F ACP DISCUSS/DSCN MKR DOCD: CPT | Performed by: NURSE PRACTITIONER

## 2019-12-03 PROCEDURE — 99214 OFFICE O/P EST MOD 30 MIN: CPT | Performed by: NURSE PRACTITIONER

## 2019-12-03 PROCEDURE — G8427 DOCREV CUR MEDS BY ELIG CLIN: HCPCS | Performed by: NURSE PRACTITIONER

## 2019-12-03 RX ORDER — PRAVASTATIN SODIUM 10 MG
TABLET ORAL
COMMUNITY
Start: 2019-09-12

## 2019-12-03 ASSESSMENT — ENCOUNTER SYMPTOMS
ABDOMINAL PAIN: 0
VOMITING: 0
EYE PAIN: 0
CONSTIPATION: 0
WHEEZING: 0
EYE REDNESS: 0
SHORTNESS OF BREATH: 0
BACK PAIN: 0
NAUSEA: 0
COUGH: 0
COLOR CHANGE: 0

## 2020-09-10 RX ORDER — MIRABEGRON 50 MG/1
TABLET, FILM COATED, EXTENDED RELEASE ORAL
Qty: 30 TABLET | Refills: 0 | Status: SHIPPED | OUTPATIENT
Start: 2020-09-10

## 2020-09-10 NOTE — TELEPHONE ENCOUNTER
Chart was reviewed. Patient's last 2 appointments were canceled secondary to COVID and due to patient's request.  I will give 1 refill of this medication but she does need to make an appointment in order to get further refills.

## 2023-05-17 NOTE — PROGRESS NOTES
PELVIC FLOOR REHAB FOLLOW UP    At last visit (PFR # 1, 1 week ago): Was able to hold for 6 seconds at weak, wavy amplitude. Fatigue after 5 repetitions.      Home exercise regimen prescribed:   Repetitions - 6   Contract - 6 sec   Relax - 10 sec   + 10 Quick Flicks  Frequency- 4 times daily     Stimulated with 53 average mA for 8 minutes. Symptoms  Daytime voiding frequency: q 1-2 hrs, unchanged  Nighttime voiding frequency: 3 times per night, unchanged  Urgency: moderate, unchanged    Incontinence episodes per day: 5-10, Causes: bending, lifting, standing, walking, unchanged  Number of pads used per day: 3-4, unchanged    Pelvic pain: unchanged    Bowel habits: unchanged  Fecal incontinence: unchanged    OVERALL IMPROVEMENT SINCE INITIAL SESSION:   minimal in degree - Patient states she has noticed she can hold her bladder a little longer. Compliance:  Has pt completed exercises as instructed: Yes    Changes Since Initial Visit  Intake of spicy/citrus/tomato based foods: unchanged - occasional  Caffeine intake per day: unchanged 8 oz daily  Fluid intake per day: unchanged- rare  Alcohol intake: unchanged  Smoking: unchanged - none      Was able to hold for 6 seconds at moderate amplitude. Fatigue after 4 repetitions.      Home exercise regimen prescribed:   Repetitions - 6   Contract - 6 sec   Relax - 10 sec   + 10 Quick Flicks  Frequency- 4 times daily    Unable to perform stim due to machine malfunction Pt's son called back and pt is scheduled for tomorrow